# Patient Record
Sex: FEMALE | URBAN - METROPOLITAN AREA
[De-identification: names, ages, dates, MRNs, and addresses within clinical notes are randomized per-mention and may not be internally consistent; named-entity substitution may affect disease eponyms.]

---

## 2020-09-24 ENCOUNTER — APPOINTMENT (RX ONLY)
Dept: URBAN - METROPOLITAN AREA CLINIC 63 | Facility: CLINIC | Age: 70
Setting detail: DERMATOLOGY
End: 2020-09-24

## 2020-09-24 DIAGNOSIS — L71.0 PERIORAL DERMATITIS: ICD-10-CM

## 2020-09-24 PROCEDURE — ? TREATMENT REGIMEN

## 2020-09-24 PROCEDURE — 99202 OFFICE O/P NEW SF 15 MIN: CPT

## 2020-09-24 PROCEDURE — ? PRESCRIPTION

## 2020-09-24 PROCEDURE — ? COUNSELING

## 2020-09-24 RX ORDER — MINOCYCLINE HYDROCHLORIDE 100 MG/1
CAPSULE ORAL
Qty: 60 | Refills: 2 | Status: ERX | COMMUNITY
Start: 2020-09-24

## 2020-09-24 RX ORDER — TACROLIMUS 1 MG/G
OINTMENT TOPICAL
Qty: 1 | Refills: 6 | Status: ERX | COMMUNITY
Start: 2020-09-24

## 2020-09-24 RX ORDER — SULFACETAMIDE SODIUM 100 MG/ML
LOTION TOPICAL QAM
Qty: 1 | Refills: 6 | Status: ERX | COMMUNITY
Start: 2020-09-24

## 2020-09-24 RX ADMIN — MINOCYCLINE HYDROCHLORIDE: 100 CAPSULE ORAL at 00:00

## 2020-09-24 RX ADMIN — TACROLIMUS: 1 OINTMENT TOPICAL at 00:00

## 2020-09-24 RX ADMIN — SULFACETAMIDE SODIUM: 100 LOTION TOPICAL at 00:00

## 2020-09-24 ASSESSMENT — LOCATION DETAILED DESCRIPTION DERM
LOCATION DETAILED: LEFT NASOLABIAL FOLD
LOCATION DETAILED: RIGHT LOWER CUTANEOUS LIP
LOCATION DETAILED: RIGHT INFERIOR MEDIAL MALAR CHEEK
LOCATION DETAILED: LEFT MEDIAL BUCCAL CHEEK

## 2020-09-24 ASSESSMENT — LOCATION SIMPLE DESCRIPTION DERM
LOCATION SIMPLE: LEFT CHEEK
LOCATION SIMPLE: RIGHT CHEEK
LOCATION SIMPLE: RIGHT LIP
LOCATION SIMPLE: LEFT LIP

## 2020-09-24 ASSESSMENT — LOCATION ZONE DERM
LOCATION ZONE: LIP
LOCATION ZONE: FACE

## 2020-09-24 NOTE — PROCEDURE: MIPS QUALITY
Quality 226: Preventive Care And Screening: Tobacco Use: Screening And Cessation Intervention: Patient screened for tobacco use and is an ex/non-smoker
Detail Level: Detailed
Quality 111:Pneumonia Vaccination Status For Older Adults: Pneumococcal Vaccination Previously Received
Quality 431: Preventive Care And Screening: Unhealthy Alcohol Use - Screening: Patient screened for unhealthy alcohol use using a single question and scores less than 2 times per year
Quality 110: Preventive Care And Screening: Influenza Immunization: Influenza Immunization not Administered for Documented Reasons.

## 2020-09-24 NOTE — PROCEDURE: TREATMENT REGIMEN
Plan: Discussed with patient if she sees improvement after two weeks to start taking Minocycline 100 mg PO daily vs BID.
Samples Given: Aveeno Ultra Calming SPF Moisturizer
Detail Level: Detailed
Otc Regimen: Recommended gentle cleanser, oil free moisturizer, and Aveeno Ultra Calming SPF Moisturizer
Discontinue Regimen: Triamcinolone 0.1% cream
Initiate Treatment: Sulfa 10% lotion QAM\\nProtopic 0.1% ointment QHS\\nMinocycline 100 mg PO BID

## 2020-09-24 NOTE — HPI: RASH
How Severe Is Your Rash?: mild
Is This A New Presentation, Or A Follow-Up?: Rash
Additional History: Patient states that she saw PCP Dr. Cronin a couple of weeks ago who initially thought flares were related to rosacea. She states that recently she was prescribed Triamcinolone 0.1% cream in which she still uses twice daily which helps with the inflammation but flares never completely go away. She states she was also prescribed Doxycycline 100 mg in which she took by mouth once daily and Clindamycin 1% lotion in which she was only on the regimen for a couple of days because she started to have redness to the entire face. She states that Dr. Cronin advised her to discontinue both medications and referred her to Dr. Ramirez for further evaluation and treatment.

## 2021-02-26 ENCOUNTER — APPOINTMENT (RX ONLY)
Dept: URBAN - METROPOLITAN AREA CLINIC 63 | Facility: CLINIC | Age: 71
Setting detail: DERMATOLOGY
End: 2021-02-26

## 2021-02-26 DIAGNOSIS — L71.0 PERIORAL DERMATITIS: ICD-10-CM

## 2021-02-26 PROCEDURE — ? COUNSELING

## 2021-02-26 PROCEDURE — ? PRESCRIPTION

## 2021-02-26 PROCEDURE — ? TREATMENT REGIMEN

## 2021-02-26 PROCEDURE — 99213 OFFICE O/P EST LOW 20 MIN: CPT

## 2021-02-26 RX ORDER — METRONIDAZOLE 7.5 MG/G
GEL TOPICAL QHS
Qty: 1 | Refills: 6 | Status: ERX | COMMUNITY
Start: 2021-02-26

## 2021-02-26 RX ADMIN — METRONIDAZOLE: 7.5 GEL TOPICAL at 00:00

## 2021-02-26 ASSESSMENT — LOCATION DETAILED DESCRIPTION DERM
LOCATION DETAILED: LEFT INFERIOR MEDIAL MALAR CHEEK
LOCATION DETAILED: LEFT MEDIAL BUCCAL CHEEK
LOCATION DETAILED: RIGHT INFERIOR CENTRAL MALAR CHEEK

## 2021-02-26 ASSESSMENT — LOCATION SIMPLE DESCRIPTION DERM
LOCATION SIMPLE: LEFT CHEEK
LOCATION SIMPLE: RIGHT CHEEK

## 2021-02-26 ASSESSMENT — LOCATION ZONE DERM: LOCATION ZONE: FACE

## 2021-02-26 NOTE — PROCEDURE: TREATMENT REGIMEN
Plan: Patient advised to consider f/u with a Allergist for allergy testing
Samples Given: Targadox 50 mg once daily. \\n-Patient to call in if the Targadox samples are effective, can send in the generic Doxycycline Hyclate 50 mg once daily. \\n\\nRosadan 0.75 % Gel apply once daily in the evening.  \\n\\nVanicream Moisturizing lotion.  \\nVanicream Gentle Facial Cleanser.
Detail Level: Detailed
Continue Regimen: Sulfa 10% lotion once daily in the morning.
Otc Regimen: Xyzal 5 mg once daily in the morning.
Discontinue Regimen: Francesco & Francesco lavender lotion. \\nDial Soap. \\nDupilatory Wax. \\nVaseline Intensive Care Lotion. \\nOil Olay products.
Initiate Treatment: Metronidazole 0.75 % Gel once daily in the evening.

## 2021-03-12 ENCOUNTER — RX ONLY (OUTPATIENT)
Age: 71
Setting detail: RX ONLY
End: 2021-03-12

## 2021-03-12 RX ORDER — SULFACETAMIDE SODIUM 100 MG/ML
LOTION TOPICAL QAM
Qty: 1 | Refills: 6 | Status: ERX

## 2021-03-12 RX ORDER — METRONIDAZOLE 7.5 MG/G
GEL TOPICAL
Qty: 1 | Refills: 6 | Status: ERX

## 2021-03-12 RX ORDER — DOXYCYCLINE HYCLATE 50 MG/1
CAPSULE, GELATIN COATED ORAL
Qty: 30 | Refills: 3 | Status: ERX | COMMUNITY
Start: 2021-03-12